# Patient Record
Sex: FEMALE | Race: WHITE | Employment: FULL TIME | URBAN - METROPOLITAN AREA
[De-identification: names, ages, dates, MRNs, and addresses within clinical notes are randomized per-mention and may not be internally consistent; named-entity substitution may affect disease eponyms.]

---

## 2018-04-02 ENCOUNTER — TELEPHONE (OUTPATIENT)
Dept: OBGYN CLINIC | Facility: CLINIC | Age: 28
End: 2018-04-02

## 2018-04-09 ENCOUNTER — OFFICE VISIT (OUTPATIENT)
Dept: OBGYN CLINIC | Facility: CLINIC | Age: 28
End: 2018-04-09

## 2018-04-09 VITALS
BODY MASS INDEX: 26.22 KG/M2 | DIASTOLIC BLOOD PRESSURE: 70 MMHG | HEART RATE: 91 BPM | SYSTOLIC BLOOD PRESSURE: 118 MMHG | WEIGHT: 173 LBS | HEIGHT: 68 IN

## 2018-04-09 DIAGNOSIS — Z34.01 ENCOUNTER FOR SUPERVISION OF NORMAL FIRST PREGNANCY IN FIRST TRIMESTER: Primary | ICD-10-CM

## 2018-04-09 RX ORDER — PRENATAL VIT/IRON FUM/FOLIC AC 27MG-0.8MG
1 TABLET ORAL DAILY
COMMUNITY

## 2018-05-11 ENCOUNTER — OFFICE VISIT (OUTPATIENT)
Dept: OBGYN CLINIC | Facility: CLINIC | Age: 28
End: 2018-05-11

## 2018-05-11 VITALS
SYSTOLIC BLOOD PRESSURE: 116 MMHG | BODY MASS INDEX: 26.67 KG/M2 | HEIGHT: 68 IN | DIASTOLIC BLOOD PRESSURE: 82 MMHG | WEIGHT: 176 LBS

## 2018-05-11 DIAGNOSIS — Z34.01 ENCOUNTER FOR SUPERVISION OF NORMAL FIRST PREGNANCY IN FIRST TRIMESTER: Primary | ICD-10-CM

## 2018-05-11 NOTE — PROGRESS NOTES
C/O vaginal discharge, no itching  Speculum: normal discharge, reassured  Labs incomplete, ordered  Moving end of June to [place]  4 weeks

## 2018-05-31 ENCOUNTER — TELEPHONE (OUTPATIENT)
Dept: OBGYN CLINIC | Facility: CLINIC | Age: 28
End: 2018-05-31

## 2018-05-31 NOTE — TELEPHONE ENCOUNTER
Pt has DEEP scheduled on 6/9   Pt is calling because she wants to know if she can have US scheduled on or before that date  Pt states after that appointment she will be moving out of [place] and she will loose her insurance  Pt would like to get a call back t

## 2018-05-31 NOTE — TELEPHONE ENCOUNTER
Contacted patient. Let her know that we prefer that she have her anatomy scan done at 20 wks because before that date, it can be difficult to fully see all the views needed which often means having to repeat the u/s again in 2-3 wks.  If she were to have he

## 2018-06-08 PROBLEM — Z34.02 ENCOUNTER FOR SUPERVISION OF NORMAL FIRST PREGNANCY IN SECOND TRIMESTER: Status: ACTIVE | Noted: 2018-04-09

## 2018-06-08 NOTE — PROGRESS NOTES
DEEP    Doing well. No complaints. Denies abdominal/pelvic pain or vaginal bleeding. Rh positive, antibody screen not found on prenatal record. Order repeat.     Genetic screening declines, states she does not want information because it wont alter cours

## 2018-06-09 ENCOUNTER — LAB ENCOUNTER (OUTPATIENT)
Dept: LAB | Age: 28
End: 2018-06-09
Attending: OBSTETRICS & GYNECOLOGY
Payer: COMMERCIAL

## 2018-06-09 ENCOUNTER — OFFICE VISIT (OUTPATIENT)
Dept: OBGYN CLINIC | Facility: CLINIC | Age: 28
End: 2018-06-09

## 2018-06-09 VITALS
BODY MASS INDEX: 28.59 KG/M2 | SYSTOLIC BLOOD PRESSURE: 98 MMHG | DIASTOLIC BLOOD PRESSURE: 64 MMHG | WEIGHT: 188.63 LBS | HEIGHT: 68 IN

## 2018-06-09 DIAGNOSIS — Z34.02 ENCOUNTER FOR SUPERVISION OF NORMAL FIRST PREGNANCY IN SECOND TRIMESTER: ICD-10-CM

## 2018-06-09 DIAGNOSIS — Z34.02 ENCOUNTER FOR SUPERVISION OF NORMAL FIRST PREGNANCY IN SECOND TRIMESTER: Primary | ICD-10-CM

## 2018-06-09 PROCEDURE — 86850 RBC ANTIBODY SCREEN: CPT | Performed by: OBSTETRICS & GYNECOLOGY

## 2018-06-09 PROCEDURE — 86901 BLOOD TYPING SEROLOGIC RH(D): CPT | Performed by: OBSTETRICS & GYNECOLOGY

## 2018-06-09 PROCEDURE — 86900 BLOOD TYPING SEROLOGIC ABO: CPT | Performed by: OBSTETRICS & GYNECOLOGY

## 2018-06-22 ENCOUNTER — ROUTINE PRENATAL (OUTPATIENT)
Dept: OBGYN CLINIC | Facility: CLINIC | Age: 28
End: 2018-06-22

## 2018-06-22 ENCOUNTER — APPOINTMENT (OUTPATIENT)
Dept: OBGYN CLINIC | Facility: CLINIC | Age: 28
End: 2018-06-22

## 2018-06-22 VITALS — SYSTOLIC BLOOD PRESSURE: 122 MMHG | WEIGHT: 192 LBS | BODY MASS INDEX: 29 KG/M2 | DIASTOLIC BLOOD PRESSURE: 72 MMHG

## 2018-06-22 DIAGNOSIS — Z36.89 ENCOUNTER FOR FETAL ANATOMIC SURVEY: Primary | ICD-10-CM

## 2018-06-22 DIAGNOSIS — Z3A.20 20 WEEKS GESTATION OF PREGNANCY: ICD-10-CM

## 2018-06-22 PROCEDURE — 76805 OB US >/= 14 WKS SNGL FETUS: CPT | Performed by: OBSTETRICS & GYNECOLOGY

## 2018-06-22 NOTE — PROGRESS NOTES
DEEP  Doing well  RH pos  S/P Anatomy scan wnl  1 hr glucose (24-28wks)  TDAP recommended during pregnancy and instructions given  Discussed wt gain.  Watch diet  Pt moving to [place], she will e establishing care there

## 2018-07-30 ENCOUNTER — TELEPHONE (OUTPATIENT)
Dept: OBGYN CLINIC | Facility: CLINIC | Age: 28
End: 2018-07-30

## 2018-07-30 NOTE — TELEPHONE ENCOUNTER
Received Medical Records request from [address] [place] [address] Pkw[address]  Phone [phone]  Fax [phone]    Faxed Pregnancy Episode    Copy in [facility]

## 2023-07-26 NOTE — TELEPHONE ENCOUNTER
Contacted patient. She reports that she has been seen in another office for her pregnancy, but desires to switch practices.      LMP 18 giving her an EDC of 10/12/18 and making her 12 4/7 wks today    Patient reports being a  1 para 0    Past med
New OB patient called today and wants to transfer to our office. PT states she is about 10 weeks pregnant with a LMP of 01/05/2018. PT already had first OB in a different office and is aware we will need those records.     PT is at work until 3:30 PM; pl
Patient called again today, she said nobody called her yesterday. Told pt , nurses was just super busy yesterday and she will get a call today.
Received medical records from [facility] in [place] and placed in Dr. Stephane wallace.
109.2

## 2024-08-28 NOTE — PROGRESS NOTES
NOB  Transfer care from [facility]  Regular menses  PMH: anxiety/depression (off all meds)  PSH: bunion  Scan: SIUP, 9w 4d, cardiac activity seen  Prenatal care and course discussed with patient including ultrasounds, genetic testing options, frequency of visi Detail Level: Zone Recommendation Preamble: The following recommendations were made during the visit: Recommendations (Free Text): Instructions: Monthly self-skin checks to monitor for any changes in moles are recommended.\\nExpectations: Benign Nevi are pigmented nests of cells within the skin. No treatment is necessary.\\nContact Office if: Any moles change in size, shape or color; itch, burn or bleed.\\nABCDE of melanoma discussed for new patients.